# Patient Record
Sex: FEMALE | Race: WHITE
[De-identification: names, ages, dates, MRNs, and addresses within clinical notes are randomized per-mention and may not be internally consistent; named-entity substitution may affect disease eponyms.]

---

## 2021-08-23 ENCOUNTER — HOSPITAL ENCOUNTER (EMERGENCY)
Dept: HOSPITAL 75 - ER | Age: 21
Discharge: HOME | End: 2021-08-23
Payer: SELF-PAY

## 2021-08-23 VITALS — DIASTOLIC BLOOD PRESSURE: 98 MMHG | SYSTOLIC BLOOD PRESSURE: 135 MMHG

## 2021-08-23 VITALS — HEIGHT: 64.96 IN | BODY MASS INDEX: 24.98 KG/M2 | WEIGHT: 149.91 LBS

## 2021-08-23 DIAGNOSIS — M79.602: Primary | ICD-10-CM

## 2021-08-23 PROCEDURE — 99281 EMR DPT VST MAYX REQ PHY/QHP: CPT

## 2021-08-23 NOTE — ED GENERAL
General


Chief Complaint:  General Problems/Pain


Stated Complaint:  ARM WEAKNESS,CHEST PAIN


Nursing Triage Note:  


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




AND LIFTING ON A RESIDENT.


Source of Information:  Patient


Exam Limitations:  No Limitations





History of Present Illness


Date Seen by Provider:  Aug 23, 2021


Time Seen by Provider:  15:00


Initial Comments


Patient is a 21-year-old female who presents to the emergency room today with a 

chief complaint of left anterior chest wall discomfort left upper arm and elbow 

discomfort, left posterior scapular discomfort.  Patient states that she "pulled

a muscle" about 2 months ago and treated it conservatively.  She states it got a

little bit better than over the course of the last several days that she has 

been moving furniture she has had a recurrence of symptoms.  Patient has not 

been taking any medications for the pain.  No anti-inflammatories or Tylenol.  

She denies using ice or warm compresses.  Patient states when she moves her left

upper arm or pushes or pulls she has extreme discomfort in her chest wall just 

underneath her left breast.  Also in her left shoulder.  She describes little 

numbness without tingling in her left arm.  No weakness or loss of function.  No

rashes or fevers.  No significant past medical history.





All other review of systems reviewed and negative except as stated.


Timing/Duration:  2-3 Days


Severity:  Moderate





Allergies and Home Medications


Patient Home Medication List


Home Medication List Reviewed:  Yes





Review of Systems


Review of Systems


Constitutional:  see HPI


EENTM:  no symptoms reported


Respiratory:  no symptoms reported


Cardiovascular:  no symptoms reported


Gastrointestinal:  no symptoms reported


Musculoskeletal:  joint pain (Left elbow), muscle pain, muscle cramps


Skin:  no symptoms reported





All Other Systems Reviewed


Negative Unless Noted:  Yes





Past Medical-Social-Family Hx


Patient Social History


Tobacco Use?:  No


Substance use?:  No


Pt feels they are or have been:  No





Physical Exam


Vital Signs





Vital Signs - First Documented








 8/23/21





 14:49


 


Temp 36.2


 


Pulse 100


 


Resp 18


 


B/P (MAP) 135/98 (110)


 


Pulse Ox 99





Capillary Refill : Less Than 3 Seconds


Height, Weight, BMI


Height: '"


Weight: lbs. oz. kg; 24.00 BMI


Method:


General Appearance:  No Apparent Distress, WD/WN


Neck:  Normal Inspection


Respiratory:  No Accessory Muscle Use, No Respiratory Distress


Cardiovascular:  Normal Peripheral Pulses


Extremity:  Normal Capillary Refill, Normal Inspection, Normal Range of Motion, 

Non Tender, Other (Patient has a little tenderness to palpation distal left 

upper arm volar aspect, no rashes.  Tenderness medial left scapula.)


Neurologic/Psychiatric:  Alert, Oriented x3, No Motor/Sensory Deficits, Normal 

Mood/Affect, CNs II-XII Norm as Tested, Other (Good strength 5/5 in all muscle 

groups of the left upper extremity also normal sensation)


Skin:  Normal Color, Warm/Dry





Progress/Results/Core Measures


Suspected Sepsis


SIRS


Temperature: 


Pulse: 100 


Respiratory Rate: 18


 


Blood Pressure 135 /98 


Mean: 110





Results/Orders


Vital Signs/I&O











 8/23/21





 14:49


 


Temp 36.2


 


Pulse 100


 


Resp 18


 


B/P (MAP) 135/98 (110)


 


Pulse Ox 99





Capillary Refill : Less Than 3 Seconds








Blood Pressure Mean:                    110








Progress Note :  


   Time:  15:28


Progress Note


Patient encouraged to use anti-inflammatories as well as alternate ice and heat 

patches to the affected areas.  She is given good return precautions including 

if she gets persistent numbness or weakness to the left arm she needs to come 

back to the emergency room for reevaluation.  Patient is comfortable with this 

plan of care.  All questions were sought and answered.  Patient is stable for 

discharge.





Departure


Impression





   Primary Impression:  


   Musculoskeletal pain of extremity


Disposition:  01 HOME, SELF-CARE


Condition:  Stable





Departure-Patient Inst.


Decision time for Depature:  15:17


Referrals:  


[facility] [place]





JUSTIN,LOCAL PHYSICIAN (PCP)


Primary Care Physician


Patient Instructions:  Muscle and Bone Pain (DC)





Add. Discharge Instructions:  


Take over-the-counter Aleve, 1 to 2 tablets twice daily for 3 to 5 days.  This 

will help with pain and inflammation.  Always take Aleve with food.





You can apply ice packs to the sore areas which may also relieve discomfort.





Gentle stretching will also help sore muscles.





You can also buy over-the-counter lidocaine patches which will help relieve 

muscle pain as well.  Over-the-counter muscle rubs might be helpful as well.





Return to the emergency room for any weakness to your left arm, loss of 

function, rashes or any other emergent concerning symptoms.











HANNAH LOPEZ MD         Aug 23, 2021 15:19

## 2021-09-20 ENCOUNTER — HOSPITAL ENCOUNTER (EMERGENCY)
Dept: HOSPITAL 75 - ER | Age: 21
Discharge: HOME | End: 2021-09-20
Payer: SELF-PAY

## 2021-09-20 VITALS — BODY MASS INDEX: 26.56 KG/M2 | WEIGHT: 149.91 LBS | HEIGHT: 62.99 IN

## 2021-09-20 VITALS — DIASTOLIC BLOOD PRESSURE: 84 MMHG | SYSTOLIC BLOOD PRESSURE: 134 MMHG

## 2021-09-20 DIAGNOSIS — G43.909: Primary | ICD-10-CM

## 2021-09-20 PROCEDURE — 99283 EMERGENCY DEPT VISIT LOW MDM: CPT

## 2021-09-20 NOTE — ED HEADACHE
General


Chief Complaint:  Head/Cervical Problems


Stated Complaint:  MIGRAINE


Nursing Triage Note:  


PT AMB TO TRIAGE WITH COMPLAINT OF HEADACHE FOR A WEEK. STATES HAS HAD EAR PAIN 


AND BLURRY VISION. STATES TYLENOL HAS NOT HELPED.


Source:  patient


Exam Limitations:  no limitations





History of Present Illness


Date Seen by Provider:  Sep 20, 2021


Time Seen by Provider:  14:16


Initial Comments


This is a well-appearing 21-year-old female who presented to the ER with 

complaints of headache behind her left eye and left side of her head.  States 

that she has had a headache for approximately 1 week about the time she had.  

New since been her scentsy pot.  States that yesterday was the only day she did 

not have a headache and she had turned the scentsy pot off.  States that it was 

turned back on yesterday and she again has a headache.





Allergies and Home Medications


Allergies


Coded Allergies:  


     nitrofurantoin (Verified  Allergy, Unknown, 9/20/21)





Past Medical-Social-Family Hx


Patient Social History


Tobacco Use?:  No


Use of E-Cig and/or Vaping dev:  No


Substance use?:  No


Alcohol Use?:  No


Pt feels they are or have been:  No





Physical Exam


Vital Signs





Vital Signs - First Documented








 9/20/21





 14:06


 


Temp 36.9


 


Pulse 87


 


Resp 19


 


B/P (MAP) 134/84 (101)


 


Pulse Ox 98


 


O2 Delivery Room Air





Capillary Refill : Less Than 3 Seconds


Height, Weight, BMI


Height: '"


Weight: lbs. oz. kg; 26.00 BMI


Method:





Progress/Results/Core Measures


Results/Orders


My Orders





Orders - SAMUEL ALANIS


Ibuprofen  Tablet (Motrin  Tablet) (9/20/21 14:30)





Vital Signs/I&O











 9/20/21





 14:06


 


Temp 36.9


 


Pulse 87


 


Resp 19


 


B/P (MAP) 134/84 (101)


 


Pulse Ox 98


 


O2 Delivery Room Air














Blood Pressure Mean:                    101











Departure


Impression





   Primary Impression:  


   Migraine


Disposition:  01 HOME, SELF-CARE


Condition:  Stable





Departure-Patient Inst.


Decision time for Depature:  14:32


Referrals:  


NO,LOCAL PHYSICIAN (PCP/Family)


Primary Care Physician


Patient Instructions:  Migraines (DC)





Add. Discharge Instructions:  


Plan: 


1. Avoid using scentsy pot to see if this is causing your migraines. 


2. May take Tylenol or Ibuprofen as needed for pain per package. 


3. If symptoms persist follow up with your primary care provider. 


4. Return for any new, concerning, or worsening symptoms. 





All discharge instructions reviewed with patient and/or family. Voiced 

understanding.











SAMUEL ALANIS APRN           Sep 20, 2021 14:33

## 2021-11-12 ENCOUNTER — HOSPITAL ENCOUNTER (EMERGENCY)
Dept: HOSPITAL 75 - ER | Age: 21
LOS: 1 days | Discharge: HOME | End: 2021-11-13
Payer: MEDICAID

## 2021-11-12 DIAGNOSIS — M54.14: ICD-10-CM

## 2021-11-12 DIAGNOSIS — R07.89: Primary | ICD-10-CM

## 2021-11-12 LAB
ALBUMIN SERPL-MCNC: 4.5 GM/DL (ref 3.2–4.5)
ALP SERPL-CCNC: 83 U/L (ref 40–136)
APTT PPP: YELLOW S
BACTERIA #/AREA URNS HPF: (no result) /HPF
BASOPHILS # BLD AUTO: 0.1 10^3/UL (ref 0–0.1)
BASOPHILS NFR BLD AUTO: 0 % (ref 0–10)
BILIRUB SERPL-MCNC: 0.5 MG/DL (ref 0.1–1)
BILIRUB UR QL STRIP: NEGATIVE
BUN/CREAT SERPL: 11
CALCIUM SERPL-MCNC: 9.4 MG/DL (ref 8.5–10.1)
CHLORIDE SERPL-SCNC: 104 MMOL/L (ref 98–107)
CO2 SERPL-SCNC: 22 MMOL/L (ref 21–32)
CREAT SERPL-MCNC: 0.8 MG/DL (ref 0.6–1.3)
EOSINOPHIL # BLD AUTO: 0.1 10^3/UL (ref 0–0.3)
EOSINOPHIL NFR BLD AUTO: 1 % (ref 0–10)
FIBRINOGEN PPP-MCNC: CLEAR MG/DL
GFR SERPLBLD BASED ON 1.73 SQ M-ARVRAT: 91 ML/MIN
GLUCOSE SERPL-MCNC: 90 MG/DL (ref 70–105)
GLUCOSE UR STRIP-MCNC: NEGATIVE MG/DL
HCT VFR BLD CALC: 40 % (ref 35–52)
HGB BLD-MCNC: 13.6 G/DL (ref 11.5–16)
KETONES UR QL STRIP: NEGATIVE
LEUKOCYTE ESTERASE UR QL STRIP: NEGATIVE
LYMPHOCYTES # BLD AUTO: 3.6 10^3/UL (ref 1–4)
LYMPHOCYTES NFR BLD AUTO: 31 % (ref 12–44)
MANUAL DIFFERENTIAL PERFORMED BLD QL: NO
MCH RBC QN AUTO: 30 PG (ref 25–34)
MCHC RBC AUTO-ENTMCNC: 34 G/DL (ref 32–36)
MCV RBC AUTO: 88 FL (ref 80–99)
MONOCYTES # BLD AUTO: 0.7 10^3/UL (ref 0–1)
MONOCYTES NFR BLD AUTO: 6 % (ref 0–12)
NEUTROPHILS # BLD AUTO: 7 10^3/UL (ref 1.8–7.8)
NEUTROPHILS NFR BLD AUTO: 61 % (ref 42–75)
NITRITE UR QL STRIP: NEGATIVE
PH UR STRIP: 6 [PH] (ref 5–9)
PLATELET # BLD: 278 10^3/UL (ref 130–400)
PMV BLD AUTO: 11.1 FL (ref 9–12.2)
POTASSIUM SERPL-SCNC: 3.6 MMOL/L (ref 3.6–5)
PROT SERPL-MCNC: 7.7 GM/DL (ref 6.4–8.2)
PROT UR QL STRIP: (no result)
RBC #/AREA URNS HPF: (no result) /HPF
SODIUM SERPL-SCNC: 139 MMOL/L (ref 135–145)
SP GR UR STRIP: >=1.03 (ref 1.02–1.02)
WBC # BLD AUTO: 11.3 10^3/UL (ref 4.3–11)
WBC #/AREA URNS HPF: (no result) /HPF

## 2021-11-12 PROCEDURE — 84484 ASSAY OF TROPONIN QUANT: CPT

## 2021-11-12 PROCEDURE — 36415 COLL VENOUS BLD VENIPUNCTURE: CPT

## 2021-11-12 PROCEDURE — 85025 COMPLETE CBC W/AUTO DIFF WBC: CPT

## 2021-11-12 PROCEDURE — 80053 COMPREHEN METABOLIC PANEL: CPT

## 2021-11-12 PROCEDURE — 86141 C-REACTIVE PROTEIN HS: CPT

## 2021-11-12 PROCEDURE — 71045 X-RAY EXAM CHEST 1 VIEW: CPT

## 2021-11-12 PROCEDURE — 84703 CHORIONIC GONADOTROPIN ASSAY: CPT

## 2021-11-12 PROCEDURE — 93005 ELECTROCARDIOGRAM TRACING: CPT

## 2021-11-12 PROCEDURE — 81000 URINALYSIS NONAUTO W/SCOPE: CPT

## 2021-11-12 NOTE — ED CHEST PAIN
General


Stated Complaint:  CP


Source:  patient


Exam Limitations:  no limitations





History of Present Illness


Date Seen by Provider:  Nov 12, 2021


Time Seen by Provider:  23:32


Initial Comments


Patient to the ER by private conveyance from home with chief complaint of 3 days

of waxing and waning left-sided chest pain worse with touch, movement or turning

her neck.  She has some tightness in her posterior rib cage around the level of 

her scapula on the left side.  No injuries.  No previous history of heart 

disease, lung disease.  She does not smoke have hypertension hyperlipidemia 

diabetes.  She states her mother was in her late 20s and had a heart attack and 

told her it was because of energy drinks.  She herself does not use caffeine but

states she does have a lot of anxiety.  She does not have any GERD or acid 

reflux symptoms.  She is not having any nausea cough fevers chills or shortness 

of air presently.  She rates it as a 4 out of 10, at worst 8 out of 10.  She had

Tylenol couple days ago which helped some and some ibuprofen earlier today which

did not help much.  She says she saw a rash on her left anterior chest but it 

went away.  No personal history of blood clots or familial history of blood 

clots.  No swelling in her legs or extremities.  No hemoptysis or coughing.  LMP

concluded 4 days prior.


 


August 2021 patient presented to the same ER for complaint of pain after pulling

a muscle from 2 months prior at the head and waxing and waning and had a 

recurrence of symptoms after she moved some furniture.  Her complaint of pain 

was described as left anterior chest wall left upper arm and left posterior 

scapular discomfort in the similar distribution as today's complaint of pain.





Allergies and Home Medications


Allergies


Coded Allergies:  


     nitrofurantoin (Verified  Allergy, Unknown, 9/20/21)





Patient Home Medication List


Home Medication List Reviewed:  Yes


Cyclobenzaprine HCl (Cyclobenzaprine HCl) 10 Mg Tablet, 10 MG PO Q8H PRN for 

SPASMS


   Prescribed by: MARLENE WRIGHT on 11/13/21 0029





Review of Systems


Review of Systems


Constitutional:  No chills, No fever, No malaise


EENTM:  No Double Vision, No Eye Tearing


Respiratory:  See HPI; Denies Cough; Shortness of Air


Cardiovascular:  See HPI, Chest Pain; Denies Lightheadedness


Gastrointestinal:  Denies Abdominal Pain, Denies Constipated, Denies Diarrhea


Genitourinary:  Denies Burning, Denies Discharge


Musculoskeletal:  No back pain, No joint pain


Skin:  No change in color, No lumps


Psychiatric/Neurological:  Anxiety; Denies Depressed, Denies Headache, Denies 

Numbness





All Other Systems Reviewed


Negative Unless Noted:  Yes





Past Medical-Social-Family Hx


Patient Social History


Tobacco Use?:  No


Use of E-Cig and/or Vaping dev:  No


Substance use?:  No


Alcohol Use?:  No





Physical Exam


Vital Signs





Vital Signs - First Documented








 11/12/21





 23:31


 


Temp 36.5


 


Pulse 96


 


Resp 20


 


B/P (MAP) 142/104 (117)


 


Pulse Ox 100


 


O2 Delivery Room Air





Capillary Refill :


Height, Weight, BMI


Height: '"


Weight: lbs. oz. kg; 26.00 BMI


Method:


General Appearance:  WD/WN, Anxious, Mild Distress


HEENT:  PERRL/EOMI, Pharynx Normal, Moist Mucous Membranes


Neck:  Full Range of Motion, Normal Inspection


Respiratory:  No Chest Non Tender; Lungs Clear, Normal Breath Sounds, No 

Accessory Muscle Use, No Respiratory Distress, Other (Chest wall is exquisitely 

tender to even light touch in the area of the ribs above the nipple line all the

way back through the axilla into the mid scapular line.  No rash or erythema.)


Cardiovascular:  Regular Rate, Rhythm, No Edema, Normal Peripheral Pulses


Gastrointestinal:  Normal Bowel Sounds, Non Tender, Soft


Extremity:  Normal Capillary Refill, Normal Inspection, Normal Range of Motion, 

Non Tender, No Calf Tenderness, No Pedal Edema


Neurologic/Psychiatric:  Alert, Oriented x3


Skin:  Normal Color, Warm/Dry





Progress/Results/Core Measures


Results/Orders


Lab Results





Laboratory Tests








Test


 11/12/21


23:35 11/12/21


23:40 Range/Units


 


 


White Blood Count


 11.3 H


 


 4.3-11.0


10^3/uL


 


Red Blood Count


 4.51 


 


 3.80-5.11


10^6/uL


 


Hemoglobin 13.6   11.5-16.0  g/dL


 


Hematocrit 40   35-52  %


 


Mean Corpuscular Volume 88   80-99  fL


 


Mean Corpuscular Hemoglobin 30   25-34  pg


 


Mean Corpuscular Hemoglobin


Concent 34 


 


 32-36  g/dL





 


Red Cell Distribution Width 11.9   10.0-14.5  %


 


Platelet Count


 278 


 


 130-400


10^3/uL


 


Mean Platelet Volume 11.1   9.0-12.2  fL


 


Immature Granulocyte % (Auto) 0    %


 


Neutrophils (%) (Auto) 61   42-75  %


 


Lymphocytes (%) (Auto) 31   12-44  %


 


Monocytes (%) (Auto) 6   0-12  %


 


Eosinophils (%) (Auto) 1   0-10  %


 


Basophils (%) (Auto) 0   0-10  %


 


Neutrophils # (Auto)


 7.0 


 


 1.8-7.8


10^3/uL


 


Lymphocytes # (Auto)


 3.6 


 


 1.0-4.0


10^3/uL


 


Monocytes # (Auto)


 0.7 


 


 0.0-1.0


10^3/uL


 


Eosinophils # (Auto)


 0.1 


 


 0.0-0.3


10^3/uL


 


Basophils # (Auto)


 0.1 


 


 0.0-0.1


10^3/uL


 


Immature Granulocyte # (Auto)


 0.0 


 


 0.0-0.1


10^3/uL


 


Sodium Level 139   135-145  MMOL/L


 


Potassium Level 3.6   3.6-5.0  MMOL/L


 


Chloride Level 104     MMOL/L


 


Carbon Dioxide Level 22   21-32  MMOL/L


 


Anion Gap 13   5-14  MMOL/L


 


Blood Urea Nitrogen 9   7-18  MG/DL


 


Creatinine


 0.80 


 


 0.60-1.30


MG/DL


 


Estimat Glomerular Filtration


Rate 91 


 


  





 


BUN/Creatinine Ratio 11    


 


Glucose Level 90     MG/DL


 


Calcium Level 9.4   8.5-10.1  MG/DL


 


Corrected Calcium 9.0   8.5-10.1  MG/DL


 


Total Bilirubin 0.5   0.1-1.0  MG/DL


 


Aspartate Amino Transf


(AST/SGOT) 16 


 


 5-34  U/L





 


Alanine Aminotransferase


(ALT/SGPT) 14 


 


 0-55  U/L





 


Alkaline Phosphatase 83     U/L


 


Troponin I < 0.028   <0.028  NG/ML


 


C-Reactive Protein High


Sensitivity 0.03 


 


 0.00-0.50


MG/DL


 


Total Protein 7.7   6.4-8.2  GM/DL


 


Albumin 4.5   3.2-4.5  GM/DL


 


Urine Color  YELLOW   


 


Urine Clarity  CLEAR   


 


Urine pH  6.0  5-9  


 


Urine Specific Gravity  >=1.030  1.016-1.022  


 


Urine Protein  2+ H NEGATIVE  


 


Urine Glucose (UA)  NEGATIVE  NEGATIVE  


 


Urine Ketones  NEGATIVE  NEGATIVE  


 


Urine Nitrite  NEGATIVE  NEGATIVE  


 


Urine Bilirubin  NEGATIVE  NEGATIVE  


 


Urine Urobilinogen  0.2  < = 1.0  MG/DL


 


Urine Leukocyte Esterase  NEGATIVE  NEGATIVE  


 


Urine RBC (Auto)  NEGATIVE  NEGATIVE  


 


Urine RBC  NONE   /HPF


 


Urine WBC  NONE   /HPF


 


Urine Squamous Epithelial


Cells 


 10-25 H


  /HPF





 


Urine Crystals  NONE   /LPF


 


Urine Bacteria  TRACE   /HPF


 


Urine Casts  NONE   /LPF


 


Urine Mucus  LARGE H  /LPF


 


Urine Culture Indicated  NO   








My Orders





Orders - MARLENE WRIGHT


Ekg Tracing (11/12/21 23:30)


Continuous Ekg Monitoring (11/12/21 23:30)


Ua Culture If Indicated (11/12/21 23:30)


Urine Pregnancy Bedside (11/12/21 23:30)


Cbc With Automated Diff (11/12/21 23:40)


Comprehensive Metabolic Panel (11/12/21 23:40)


Hs C Reactive Protein (11/12/21 23:40)


Troponin I (11/12/21 23:40)


Ketorolac Injection (Toradol Injection) (11/12/21 23:45)


Chest 1 View, Ap/Pa Only (11/13/21 00:01)


Cyclobenzaprine Tablet (Flexeril Tablet) (11/13/21 00:30)


Cyclobenzaprine Tablet (Flexeril Tablet) (11/13/21 00:37)





Medications Given in ED





Vital Signs/I&O











 11/12/21 11/13/21





 23:31 00:41


 


Temp 36.5 36.5


 


Pulse 96 82


 


Resp 20 18


 


B/P (MAP) 142/104 (117) 116/83


 


Pulse Ox 100 100


 


O2 Delivery Room Air Room Air











Progress


Progress Note :  


   Time:  23:45


Progress Note


Suspect she has chest wall pain perhaps she has pinched nerve in her back and 

that is causing her pain.  The other possibilities would be herpes zoster.  She 

does not have any apparent rash although it would not hurt to put her on 

acyclovir.  We will give her some Toradol and see if that helps her pain.  We 

will check some labs and get a chest x-ray.





Well's Score: 0.0 points. Low risk group: 1.3% chance of PE in an ED population.


0 PERC criteria. No need for further workup, as <2% chance of PE.


Initial ECG Impression Date:  Nov 12, 2021


Initial ECG Impression Time:  23:36


Initial ECG Rate:  87


Initial ECG Rhythm:  Normal Sinus


Initial ECG Intervals:  Normal


Initial ECG Impression:  Normal


Initial ECG Comparisson:  No Previous ECG Available





Diagnostic Imaging





   Diagonstic Imaging:  Xray


   Plain Films/CT/US/NM/MRI:  chest


Comments


No acute cardiopulmonary process on 1 view chest x-ray.


   Reviewed:  Reviewed by Me





Departure


Impression





   Primary Impression:  


   Chest wall pain


   Additional Impression:  


   Thoracic radiculopathy


Disposition:  01 HOME, SELF-CARE


Condition:  Stable





Departure-Patient Inst.


Decision time for Depature:  00:19


Referrals:  


NO,LOCAL PHYSICIAN (PCP/Family)


Primary Care Physician


Patient Instructions:  Chest Pain That Is Not Caused by the Heart (DC), 

Radiculopathy





Add. Discharge Instructions:  


I believe you have a pinched nerve along your back that is causing the pain 

distribution from your shoulder blade to the front of your left chest.  This 

will be aggravated by movement.


You can use heating pads, topical creams such as icy hot/Biofreeze etc. or 

lidocaine patches.


Tylenol 1000 mg every 8 hours necessary for pain.


Ibuprofen 800 mg every 8 hours necessary for pain.


He can follow-up with a chiropractor or you may call physical therapy at 

[phone] for a no upfront cost evaluation.


Follow-up with your primary care doctor for further evaluation if your symptoms 

are persisting for more than a couple weeks.


Cyclobenzaprine 1 tablet every 8 hours as necessary for muscle spasms in your 

chest wall.  Will cause drowsiness.


Scripts


Cyclobenzaprine HCl (Cyclobenzaprine HCl) 10 Mg Tablet


10 MG PO Q8H PRN for SPASMS, #15 TAB 0 Refills


   Prov: MARLENE WRIGHT         11/13/21











MARLENE WRIGHT                 Nov 12, 2021 23:47

## 2021-11-13 VITALS — SYSTOLIC BLOOD PRESSURE: 116 MMHG | DIASTOLIC BLOOD PRESSURE: 83 MMHG

## 2021-11-13 LAB — ALT SERPL-CCNC: 14 U/L (ref 0–55)

## 2021-11-15 NOTE — DIAGNOSTIC IMAGING REPORT
EXAMINATION: Chest radiograph, portable AP view.



DATE: 11/15/2021 7:07 AM



INDICATION: 21-year-old female, chest pain.



COMPARISON:  None.



FINDINGS: Heart size and mediastinal contours are unremarkable.

There is no identified pneumothorax. There is no large pleural

effusion. There is no identified focal airspace consolidation.



IMPRESSION: No identified acute cardiopulmonary abnormality.



Dictated by: 



  Dictated on workstation # [id number]

## 2023-08-20 ENCOUNTER — HOSPITAL ENCOUNTER (EMERGENCY)
Dept: HOSPITAL 75 - ER | Age: 23
Discharge: HOME | End: 2023-08-20
Payer: SELF-PAY

## 2023-08-20 VITALS — DIASTOLIC BLOOD PRESSURE: 79 MMHG | SYSTOLIC BLOOD PRESSURE: 115 MMHG

## 2023-08-20 VITALS — WEIGHT: 154.32 LBS | BODY MASS INDEX: 27.34 KG/M2 | HEIGHT: 62.99 IN

## 2023-08-20 DIAGNOSIS — Z88.1: ICD-10-CM

## 2023-08-20 DIAGNOSIS — F17.290: ICD-10-CM

## 2023-08-20 DIAGNOSIS — J02.0: Primary | ICD-10-CM

## 2023-08-20 PROCEDURE — 99283 EMERGENCY DEPT VISIT LOW MDM: CPT

## 2023-08-20 PROCEDURE — 87430 STREP A AG IA: CPT

## 2023-08-20 NOTE — ED EENT
History of Present Illness


General


Chief Complaint:  Oral/Throat Problems


Stated Complaint:  SORE THROAT/ SWOLLEN TONSILS


Nursing Triage Note:  


PT AMB TO ED BY POV WITH C/ SORE THROAT. PT REPORTS SHE THOUGHT THE SORE THROAT 


WAS RELATED TO ALLERGIES YESTERDAY, BUT PAIN IS MUCH WORSE TODAY.


Source:  patient


Exam Limitations:  no limitations





History of Present Illness


Date Seen by Provider:  Aug 20, 2023


Time Seen by Provider:  16:06


Initial Comments


23-year-old female presents to the ER with complaint of throat pain starting 

yesterday.  She states the pain is gotten worse today.  Reports that it hurts 

all the time, but worse with swallowing.  Reports a temperature of 100.2 at 

home.  Denies abdominal pain, nausea, vomiting.  Past medical history includes 

ventricular heart disease, and inappropriate sinus tachycardia.  She takes 

metoprolol.





Allergies and Home Medications


Allergies


Coded Allergies:  


     nitrofurantoin (Verified  Allergy, Unknown, 9/20/21)





Patient Home Medication List


Home Medication List Reviewed:  Yes


Amoxicillin (Amoxicillin) 500 Mg Tablet, 500 MG PO BID


   Prescribed by: Macey Kennedy on 8/20/23 1654


Cyclobenzaprine HCl (Cyclobenzaprine HCl) 10 Mg Tablet, 10 MG PO Q8H PRN for 

SPASMS


   Prescribed by: MARLENE WRIGHT on 11/13/21 0029





Review of Systems


Review of Systems


Constitutional:  see HPI


Throat:  see HPI





Past Medical-Social-Family Hx


Patient Social History


Tobacco Use?:  No


Use of E-Cig and/or Vaping dev:  Yes


E-Cig or Vaping type used:  Nicotine


Use of E-Cig and/or Vaping Junior:  Current Everyday User


Substance use?:  No


Alcohol Use?:  No


Pt feels they are or have been:  No





Immunizations Up To Date


Influenza Vaccine Up-to-Date:  No; Not Current





Past Medical History


Surgery/Hospitalization HX:  


VENTRICULAR HEART DISEASE





Physical Exam


Vital Signs





Vital Signs - First Documented








 8/20/23





 16:09


 


Temp 37.8


 


Pulse 102


 


Resp 18


 


B/P (MAP) 118/76 (90)


 


Pulse Ox 99


 


O2 Delivery Room Air








Height, Weight, BMI


Height: '"


Weight: lbs. oz. kg; 27.00 BMI


Method:


General Appearance:  WD/WN, no apparent distress


Mouth/Throat:  tonsillar exudate, tonsillar swelling; No uvula swelling, No 

voice changes; other (Pharyngeal erythema, uvula midline)


Neck:  supple, normal inspection


Cardiovascular:  regular rate, rhythm


Respiratory:  lungs clear, normal breath sounds, no respiratory distress, no 

accessory muscle use


Neurologic/Psychiatric:  alert, normal mood/affect


Skin:  normal color, warm/dry





Progress/Results/Core Measures


Results/Orders


Lab Results





Laboratory Tests








Test


 8/20/23


16:11 Range/Units


 


 


Group A Streptococcus Screen POSITIVE H NEGATIVE  








My Orders





Orders - MACEY DAVIS


Rapid Strep A Screen (8/20/23 16:06)


Amoxicillin Capsule (Amoxicillin Capsule (8/20/23 17:00)





Medications Given in ED





Current Medications








 Medications  Dose


 Ordered  Sig/Gustavo


 Route  Start Time


 Stop Time Status Last Admin


Dose Admin


 


 Amoxicillin  500 mg  ONCE  ONCE


 PO  8/20/23 17:00


 8/20/23 17:01 DC 8/20/23 16:57


500 MG








Vital Signs/I&O











 8/20/23 8/20/23





 16:09 17:01


 


Temp 37.8 


 


Pulse 102 104


 


Resp 18 16


 


B/P (MAP) 118/76 (90) 115/79


 


Pulse Ox 99 98


 


O2 Delivery Room Air Room Air














Blood Pressure Mean:                    90











Progress


Progress Note :  


Progress Note


Patient seen and evaluated, resting comfortably in recliner, no acute distress. 

Based on exam and symptoms, this is likely strep pharyngitis.  Rapid strep 

ordered.  I do not think patient has a retropharyngeal abscess, uvula is 

midline, patient is in no acute distress.





1651 strep positive.  I discussed options for treatment with patient.  He states

she would rather have the prescription and get a shot of penicillin.  Will give 

first dose of antibiotic now and discharged with prescription.  Discharge 

instructions and return precautions provided.





Departure


Impression





   Primary Impression:  


   Streptococcal sore throat


Disposition:  01 HOME, SELF-CARE


Condition:  Stable





Departure-Patient Inst.


Decision time for Depature:  16:51


Referrals:  


NO,LOCAL PHYSICIAN (PCP/Family)


Primary Care Physician


Patient Instructions:  Strep Throat (DC)





Add. Discharge Instructions:  


Complete full course of antibiotic as prescribed, do not stop taking even if you

begin to feel better.


Follow-up with primary care provider.


Return for inability to swallow, severe shortness of breath, or any other new, 

concerning, or worsening symptoms.





All discharge instructions reviewed with patient and/or family. Voiced 

understanding.


Scripts


Amoxicillin (Amoxicillin) 500 Mg Tablet


500 MG PO BID for 10 Days, #19 TAB 0 Refills


   Prov: MACEY DAVIS         8/20/23


Work/School Note:  Work Release Form   Date Seen in the Emergency Department:  

Aug 20, 2023


   Return to Work:  Aug 23, 2023


   Restrictions:  No Restrictions











MACEY DAVIS          Aug 20, 2023 16:22